# Patient Record
Sex: MALE | Race: WHITE | Employment: UNEMPLOYED | URBAN - METROPOLITAN AREA
[De-identification: names, ages, dates, MRNs, and addresses within clinical notes are randomized per-mention and may not be internally consistent; named-entity substitution may affect disease eponyms.]

---

## 2017-07-25 ENCOUNTER — HOSPITAL ENCOUNTER (OUTPATIENT)
Dept: LAB | Age: 67
Discharge: HOME | End: 2017-07-25

## 2017-07-25 PROCEDURE — 88305 TISSUE EXAM BY PATHOLOGIST: CPT | Performed by: UROLOGY

## 2018-08-23 ENCOUNTER — HOSPITAL ENCOUNTER (OUTPATIENT)
Dept: LAB | Age: 68
Discharge: HOME | End: 2018-08-23

## 2018-08-23 PROCEDURE — 88305 TISSUE EXAM BY PATHOLOGIST: CPT

## 2020-09-02 ENCOUNTER — HOSPITAL ENCOUNTER (OUTPATIENT)
Dept: LAB | Age: 70
Discharge: HOME | End: 2020-09-02

## 2020-09-02 PROCEDURE — 88342 IMHCHEM/IMCYTCHM 1ST ANTB: CPT

## 2020-09-02 PROCEDURE — 88305 TISSUE EXAM BY PATHOLOGIST: CPT

## 2021-05-28 ENCOUNTER — HOSPITAL ENCOUNTER (OUTPATIENT)
Dept: MRI IMAGING | Age: 71
Discharge: HOME | End: 2021-05-28
Attending: UROLOGY
Payer: MEDICARE

## 2021-05-28 DIAGNOSIS — C61 MALIGNANT NEOPLASM OF PROSTATE (HCC): ICD-10-CM

## 2021-05-28 PROCEDURE — 72197 MRI PELVIS W/O & W/DYE: CPT

## 2021-05-28 PROCEDURE — 74011636320 HC RX REV CODE- 636/320: Performed by: UROLOGY

## 2021-05-28 PROCEDURE — A9576 INJ PROHANCE MULTIPACK: HCPCS | Performed by: UROLOGY

## 2021-05-28 PROCEDURE — 74011000258 HC RX REV CODE- 258: Performed by: UROLOGY

## 2021-05-28 RX ORDER — SODIUM CHLORIDE 0.9 % (FLUSH) 0.9 %
10 SYRINGE (ML) INJECTION
Status: COMPLETED | OUTPATIENT
Start: 2021-05-28 | End: 2021-05-28

## 2021-05-28 RX ADMIN — SODIUM CHLORIDE 100 ML: 900 INJECTION, SOLUTION INTRAVENOUS at 14:19

## 2021-05-28 RX ADMIN — GADOTERIDOL 16 ML: 279.3 INJECTION, SOLUTION INTRAVENOUS at 14:19

## 2021-05-28 RX ADMIN — Medication 10 ML: at 14:19

## 2022-06-14 DIAGNOSIS — C80.1 MALIGNANT NEOPLASM (HCC): Primary | ICD-10-CM

## 2022-06-22 ENCOUNTER — OFFICE VISIT (OUTPATIENT)
Dept: UROLOGY | Age: 72
End: 2022-06-22
Payer: COMMERCIAL

## 2022-06-22 DIAGNOSIS — C80.1 MALIGNANT NEOPLASM (HCC): Primary | ICD-10-CM

## 2022-06-22 LAB
BILIRUBIN, URINE, POC: NEGATIVE
BLOOD URINE, POC: NEGATIVE
GLUCOSE URINE, POC: NEGATIVE
KETONES, URINE, POC: NEGATIVE
LEUKOCYTE ESTERASE, URINE, POC: NEGATIVE
NITRITE, URINE, POC: NEGATIVE
PH, URINE, POC: 6.5 (ref 4.6–8)
PROTEIN,URINE, POC: NEGATIVE
SPECIFIC GRAVITY, URINE, POC: 1.02 (ref 1–1.03)
URINALYSIS CLARITY, POC: NORMAL
URINALYSIS COLOR, POC: NORMAL
UROBILINOGEN, POC: NORMAL

## 2022-06-22 PROCEDURE — 81003 URINALYSIS AUTO W/O SCOPE: CPT | Performed by: UROLOGY

## 2022-06-22 PROCEDURE — 99213 OFFICE O/P EST LOW 20 MIN: CPT | Performed by: UROLOGY

## 2022-06-22 PROCEDURE — 1123F ACP DISCUSS/DSCN MKR DOCD: CPT | Performed by: UROLOGY

## 2022-06-22 RX ORDER — AMLODIPINE BESYLATE 2.5 MG/1
TABLET ORAL
COMMUNITY
Start: 2022-06-14

## 2022-06-22 RX ORDER — ROSUVASTATIN CALCIUM 5 MG/1
TABLET, COATED ORAL
COMMUNITY
Start: 2022-06-13

## 2022-06-22 ASSESSMENT — ENCOUNTER SYMPTOMS
EYES NEGATIVE: 1
RESPIRATORY NEGATIVE: 1

## 2022-06-22 NOTE — PROGRESS NOTES
[facility]  [address] [facility] [address], [address] [facility]  [phone]          Vida Palafox  : 1950    Chief Complaint   Patient presents with    Prostate Cancer          HPI     Vida Palafox is a 67 y.o. male    PSA 6.2022  PSA 5.2021   PSA 7.  PSA 5.2020  PSA 4.  PSA 4.4 2019  PSA 4.  PSA 5.5 2018  PSA 4. 2018  PSA 4. 2017     The patient had a biopsy in 2017 that showed Akhil 6 adenocarcinoma in 15% of 1 out of 3 cores from the left mid and less than 5% of 1 out of 3 cores from the right apex.  Repeat biopsy in 2018 showed less than 5% involvement with Akhil 6 adenocarcinoma in 1 out of 3 biopsies from the right apex.  PSAs have been relatively stable.    He does have some lower urinary tract symptoms including hesitancy in the morning and some urgency, particularly when he is traveling in a car. [facility] had been on Uroxatrol but this gave him a significant headache.  At his previous biopsy prostate volume was estimated at 68 cm³.    His most recent biopsy in 2020 showed less that 5% involvement with [place] 6 adenocarcinoma in 1 out of 2 biopsies from the left apex and both of the biopsies from the right apex. The patient recently started using green tea and says that his voiding symptoms have significantly improved. He was intolerant of alpha blockade secondary to headache. Past Medical History:   Diagnosis Date    Erectile dysfunction     Personal history of prostate cancer      No past surgical history on file.   Current Outpatient Medications   Medication Sig Dispense Refill    amLODIPine (NORVASC) 2.5 MG tablet       rosuvastatin (CRESTOR) 5 MG tablet       aspirin 81 MG EC tablet Take by mouth daily      Cholecalciferol 50 MCG (2000) TABS Take by mouth      triamcinolone (ARISTOCORT) 0.5 % cream APPLY 1 APPLICATION TO AFFECTED AREA DAILY       No current facility-administered medications for this visit. Allergies   Allergen Reactions    Statins      Social History     Socioeconomic History    Marital status:      Spouse name: Not on file    Number of children: Not on file    Years of education: Not on file    Highest education level: Not on file   Occupational History    Not on file   Tobacco Use    Smoking status: Former Smoker    Smokeless tobacco: Never Used   Substance and Sexual Activity    Alcohol use: No    Drug use: Never    Sexual activity: Not on file   Other Topics Concern    Not on file   Social History Narrative    Not on file     Social Determinants of Health     Financial Resource Strain:     Difficulty of Paying Living Expenses: Not on file   Food Insecurity:     Worried About [employer] in the Last Year: Not on file    Donis of Food in the Last Year: Not on file   Transportation Needs:     Lack of Transportation (Medical): Not on file    Lack of Transportation (Non-Medical):  Not on file   Physical Activity:     Days of Exercise per Week: Not on file    Minutes of Exercise per Session: Not on file   Stress:     Feeling of Stress : Not on file   Social Connections:     Frequency of Communication with Friends and Family: Not on file    Frequency of Social Gatherings with Friends and Family: Not on file    Attends Yazidi Services: Not on file    Active Member of [address] [place] [address] or Organizations: Not on file    Attends Club or Organization Meetings: Not on file    Marital Status: Not on file   Intimate Partner Violence:     Fear of Current or Ex-Partner: Not on file    Emotionally Abused: Not on file    Physically Abused: Not on file    Sexually Abused: Not on file   Housing Stability:     Unable to Pay for Housing in the Last Year: Not on file    Number of [place] in the Last Year: Not on file    Unstable Housing in the Last Year: Not on file     Family History Problem Relation Age of Onset    Cancer Mother        Review of Systems  Constitutional: Negative  Skin: Negative skin ROS  Eyes: Eyes negative  ENT: HENT negative  Respiratory: Respiratory negative  Cardiovascular: Positive for hypertension. GI: Neg GI ROS  Genitourinary: Genitourinary negative  Musculoskeletal: Musculoskeletal negative  Neurological: Neg neuro ROS  Psychological: Neg psych ROS  Endocrine: Endocrine negative  Hem/Lymphatic: Hematologic/lymphatic negative      Urinalysis  UA - Dipstick  Results for orders placed or performed in visit on 06/22/22   AMB POC URINALYSIS DIP STICK AUTO W/O MICRO   Result Value Ref Range    Color (UA POC)      Clarity (UA POC)      Glucose, Urine, POC Negative Negative    Bilirubin, Urine, POC Negative Negative    KETONES, Urine, POC Negative Negative    Specific Gravity, Urine, POC 1.020 1.001 - 1.035    Blood (UA POC) Negative Negative    pH, Urine, POC 6.5 4.6 - 8.0    Protein, Urine, POC Negative Negative    Urobilinogen, POC Normal     Nitrite, Urine, POC Negative Negative    Leukocyte Esterase, Urine, POC Negative Negative       UA - Micro  WBC - 0  RBC - 0  Bacteria - 0  Epith - 0        Assessment and Plan    ICD-10-CM    1. Malignant neoplasm (HCC)  C80.1 AMB POC URINALYSIS DIP STICK AUTO W/O MICRO     The patient is doing well.   We will have him return for transrectal ultrasound biopsy of the prostate in August.

## 2022-06-23 ENCOUNTER — TELEPHONE (OUTPATIENT)
Dept: UROLOGY | Age: 72
End: 2022-06-23

## 2022-06-23 RX ORDER — CIPROFLOXACIN 500 MG/1
500 TABLET, FILM COATED ORAL 2 TIMES DAILY
Qty: 6 TABLET | Refills: 0 | Status: SHIPPED | OUTPATIENT
Start: 2022-06-23 | End: 2022-06-26

## 2022-08-17 ENCOUNTER — OFFICE VISIT (OUTPATIENT)
Dept: UROLOGY | Age: 72
End: 2022-08-17
Payer: COMMERCIAL

## 2022-08-17 DIAGNOSIS — C61 PROSTATE CANCER (HCC): Primary | ICD-10-CM

## 2022-08-17 PROCEDURE — 81003 URINALYSIS AUTO W/O SCOPE: CPT | Performed by: UROLOGY

## 2022-08-17 PROCEDURE — 55700 BIOPSY OF PROSTATE,NEEDLE/PUNCH: CPT | Performed by: UROLOGY

## 2022-08-17 PROCEDURE — 76942 ECHO GUIDE FOR BIOPSY: CPT | Performed by: UROLOGY

## 2022-08-17 RX ORDER — CEFTRIAXONE 1 G/1
1000 INJECTION, POWDER, FOR SOLUTION INTRAMUSCULAR; INTRAVENOUS EVERY 24 HOURS
Status: SHIPPED | OUTPATIENT
Start: 2022-08-17

## 2022-08-17 NOTE — LETTER
[facility]  [address] [place] [address] Way  [address] [address]  Phone: [phone]  Fax: [phone]    Ignacia Mcgregor MD    August 17, 2022     Hanh De Jesus MD  [address] [facility]    Patient: Uriel Miranda   MR Number: [id number]   YOB: 1950   Date of Visit: 8/17/2022       Dear Hanh De Jesus: Thank you for referring Ada Esposito to me for evaluation/treatment. Below are the relevant portions of my assessment and plan of care. If you have questions, please do not hesitate to call me. I look forward to following Susan Vasile along with you.     Sincerely,      Ignacia Mcgregor MD

## 2022-08-17 NOTE — PROGRESS NOTES
[facility]  [address] [facility]   [address]  [facility], [address] [facility]  [phone]    Madeline Mortimer  : 1950         HPI   67 y.o., male presents for prostate biopsy. Past Medical History:   Diagnosis Date    Erectile dysfunction     Personal history of prostate cancer      No past surgical history on file.   Current Outpatient Medications   Medication Sig Dispense Refill    amLODIPine (NORVASC) 2.5 MG tablet       rosuvastatin (CRESTOR) 5 MG tablet       aspirin 81 MG EC tablet Take by mouth daily      Cholecalciferol 50 MCG (2000) TABS Take by mouth      triamcinolone (ARISTOCORT) 0.5 % cream APPLY 1 APPLICATION TO AFFECTED AREA DAILY       Current Facility-Administered Medications   Medication Dose Route Frequency Provider Last Rate Last Admin    cefTRIAXone (ROCEPHIN) injection 1,000 mg  1,000 mg IntraMUSCular Q24H Marilee Kern MD         Allergies   Allergen Reactions    Statins      Social History     Socioeconomic History    Marital status:      Spouse name: Not on file    Number of children: Not on file    Years of education: Not on file    Highest education level: Not on file   Occupational History    Not on file   Tobacco Use    Smoking status: Former    Smokeless tobacco: Never   Substance and Sexual Activity    Alcohol use: No    Drug use: Never    Sexual activity: Not on file   Other Topics Concern    Not on file   Social History Narrative    Not on file     Social Determinants of Health     Financial Resource Strain: Not on file   Food Insecurity: Not on file   Transportation Needs: Not on file   Physical Activity: Not on file   Stress: Not on file   Social Connections: Not on file   Intimate Partner Violence: Not on file   Housing Stability: Not on file     Family History   Problem Relation Age of Onset    Cancer Mother        UA - Dipstick  Results for orders placed or performed in visit on 22   AMB POC URINALYSIS DIP STICK AUTO W/O MICRO   Result Value Ref Range    Color (UA POC)      Clarity (UA POC)      Glucose, Urine, POC Negative Negative    Bilirubin, Urine, POC Negative Negative    KETONES, Urine, POC Negative Negative    Specific Gravity, Urine, POC 1.020 1.001 - 1.035    Blood (UA POC) Negative Negative    pH, Urine, POC 6.5 4.6 - 8.0    Protein, Urine, POC Negative Negative    Urobilinogen, POC 0.2 mg/dL     Nitrite, Urine, POC Negative Negative    Leukocyte Esterase, Urine, POC Negative Negative       UA - Micro  WBC - 0  RBC - 0  Bacteria - 0  Epith - 0            TRANSRECTAL ULTRASOUND GUIDED BIOPSY OF THE PROSTATE    All risks, benefits and alternatives were again reviewed and he is willing to proceed at this time. The patient was placed in the left lateral decubitus position and the transrectal ultrasound probe was inserted into the rectum. The prostate was visualized and measured. The prostate appeared homogenous in appearance. Measured prostate volume is listed below. 10 cc of 1% lidocaine (plain) was infiltrated in the neurovascular bundles bilaterally. 12 needle core biopsies were obtained using a standard sextant biopsy format. Three far lateral biopsies were also obtained bilaterally. The ultrasound probe was removed. The patient tolerated the procedure well. Discharge instructions were again reviewed with the patient in detail. He was instructed to resume his antibiotics as previously ordered and was instructed to stay off all anticoagulation for 72 hours or until no further bleeding (hematuria or hematochezia) is noted. He was instructed to call the office immediately for any fevers or significant bleeding. PROSTATE VOLUME:  64 cm3    Assessment and Plan    ICD-10-CM    1.  Prostate cancer ([facility] [address]  C61 AMB POC US, TRANSRECTAL     [facility]     [facility] SURGICAL TRAYS     BIOPSY OF PROSTATE,NEEDLE/PUNCH     cefTRIAXone (ROCEPHIN) injection 1,000 mg     AMB POC URINALYSIS DIP STICK AUTO W/O MICRO     [facility]

## 2022-09-13 DIAGNOSIS — C61 PROSTATE CA (HCC): Primary | ICD-10-CM

## 2023-03-30 ENCOUNTER — OFFICE VISIT (OUTPATIENT)
Dept: UROLOGY | Age: 73
End: 2023-03-30

## 2023-03-30 DIAGNOSIS — N13.8 BPH WITH OBSTRUCTION/LOWER URINARY TRACT SYMPTOMS: ICD-10-CM

## 2023-03-30 DIAGNOSIS — N40.1 BPH WITH OBSTRUCTION/LOWER URINARY TRACT SYMPTOMS: ICD-10-CM

## 2023-03-30 DIAGNOSIS — C61 PROSTATE CA (HCC): Primary | ICD-10-CM

## 2023-03-30 LAB
BILIRUBIN, URINE, POC: NEGATIVE
BLOOD URINE, POC: NEGATIVE
GLUCOSE URINE, POC: 100
KETONES, URINE, POC: NEGATIVE
LEUKOCYTE ESTERASE, URINE, POC: NEGATIVE
NITRITE, URINE, POC: NEGATIVE
PH, URINE, POC: 6 (ref 4.6–8)
PROTEIN,URINE, POC: NEGATIVE
SPECIFIC GRAVITY, URINE, POC: 1.02 (ref 1–1.03)
URINALYSIS CLARITY, POC: NORMAL
URINALYSIS COLOR, POC: NORMAL
UROBILINOGEN, POC: NORMAL

## 2023-03-30 RX ORDER — LOSARTAN POTASSIUM AND HYDROCHLOROTHIAZIDE 12.5; 1 MG/1; MG/1
1 TABLET ORAL DAILY
COMMUNITY
Start: 2022-12-31

## 2023-03-30 ASSESSMENT — ENCOUNTER SYMPTOMS
BACK PAIN: 0
NAUSEA: 0

## 2023-03-30 NOTE — PROGRESS NOTES
Reported on 3/30/2023)      rosuvastatin (CRESTOR) 5 MG tablet  (Patient not taking: Reported on 3/30/2023)       Current Facility-Administered Medications   Medication Dose Route Frequency Provider Last Rate Last Admin    cefTRIAXone (ROCEPHIN) injection 1,000 mg  1,000 mg IntraMUSCular Q24H Judge Genet MD         Allergies   Allergen Reactions    Statins      Social History     Socioeconomic History    Marital status:      Spouse name: Not on file    Number of children: Not on file    Years of education: Not on file    Highest education level: Not on file   Occupational History    Not on file   Tobacco Use    Smoking status: Former    Smokeless tobacco: Never   Substance and Sexual Activity    Alcohol use: No    Drug use: Never    Sexual activity: Not on file   Other Topics Concern    Not on file   Social History Narrative    Not on file     Social Determinants of Health     Financial Resource Strain: Not on file   Food Insecurity: Not on file   Transportation Needs: Not on file   Physical Activity: Not on file   Stress: Not on file   Social Connections: Not on file   Intimate Partner Violence: Not on file   Housing Stability: Not on file     Family History   Problem Relation Age of Onset    Cancer Mother        Review of Systems  Constitutional:   Negative for fever. GI:  Negative for nausea. Musculoskeletal:  Negative for back pain.     Urinalysis  UA - Dipstick  Results for orders placed or performed in visit on 03/30/23   AMB POC URINALYSIS DIP STICK AUTO W/O MICRO   Result Value Ref Range    Color (UA POC)      Clarity (UA POC)      Glucose, Urine,  Negative    Bilirubin, Urine, POC Negative Negative    KETONES, Urine, POC Negative Negative    Specific Gravity, Urine, POC 1.020 1.001 - 1.035    Blood (UA POC) Negative Negative    pH, Urine, POC 6.0 4.6 - 8.0    Protein, Urine, POC Negative Negative    Urobilinogen, POC 0.2 mg/dL     Nitrite, Urine, POC Negative Negative    Leukocyte

## 2023-11-24 ENCOUNTER — NURSE ONLY (OUTPATIENT)
Dept: UROLOGY | Age: 73
End: 2023-11-24

## 2023-11-24 DIAGNOSIS — C61 PROSTATE CA (HCC): Primary | ICD-10-CM

## 2023-11-25 LAB — PSA SERPL DL<=0.01 NG/ML-MCNC: 6.03 NG/ML (ref 0–4)

## 2023-11-30 ENCOUNTER — OFFICE VISIT (OUTPATIENT)
Dept: UROLOGY | Age: 73
End: 2023-11-30
Payer: COMMERCIAL

## 2023-11-30 ENCOUNTER — TELEPHONE (OUTPATIENT)
Dept: UROLOGY | Age: 73
End: 2023-11-30

## 2023-11-30 DIAGNOSIS — N13.8 BPH WITH OBSTRUCTION/LOWER URINARY TRACT SYMPTOMS: ICD-10-CM

## 2023-11-30 DIAGNOSIS — N40.1 BPH WITH OBSTRUCTION/LOWER URINARY TRACT SYMPTOMS: ICD-10-CM

## 2023-11-30 DIAGNOSIS — C80.1 MALIGNANT NEOPLASM (HCC): ICD-10-CM

## 2023-11-30 DIAGNOSIS — R97.20 ELEVATED PROSTATE SPECIFIC ANTIGEN (PSA): ICD-10-CM

## 2023-11-30 DIAGNOSIS — C61 PROSTATE CA (HCC): Primary | ICD-10-CM

## 2023-11-30 LAB
BILIRUBIN, URINE, POC: NEGATIVE
BLOOD URINE, POC: NEGATIVE
GLUCOSE URINE, POC: NEGATIVE
KETONES, URINE, POC: NEGATIVE
LEUKOCYTE ESTERASE, URINE, POC: NEGATIVE
NITRITE, URINE, POC: NEGATIVE
PH, URINE, POC: 7 (ref 4.6–8)
PROTEIN,URINE, POC: NEGATIVE
SPECIFIC GRAVITY, URINE, POC: 1.02 (ref 1–1.03)
URINALYSIS CLARITY, POC: NORMAL
URINALYSIS COLOR, POC: NORMAL
UROBILINOGEN, POC: NORMAL

## 2023-11-30 PROCEDURE — 99213 OFFICE O/P EST LOW 20 MIN: CPT | Performed by: UROLOGY

## 2023-11-30 PROCEDURE — 81003 URINALYSIS AUTO W/O SCOPE: CPT | Performed by: UROLOGY

## 2023-11-30 PROCEDURE — 1123F ACP DISCUSS/DSCN MKR DOCD: CPT | Performed by: UROLOGY

## 2023-11-30 ASSESSMENT — ENCOUNTER SYMPTOMS
NAUSEA: 0
BACK PAIN: 0

## 2023-11-30 NOTE — TELEPHONE ENCOUNTER
----- Message from Alberto Michaud MD sent at 11/30/2023 12:40 PM EST -----  Please schedule saturation biopsies of the prostate sometime in March.  We do not need the MRI

## 2023-11-30 NOTE — PROGRESS NOTES
Activity    Alcohol use: No    Drug use: Never    Sexual activity: Not on file   Other Topics Concern    Not on file   Social History Narrative    Not on file     Social Determinants of Health     Financial Resource Strain: Not on file   Food Insecurity: Not on file   Transportation Needs: Not on file   Physical Activity: Not on file   Stress: Not on file   Social Connections: Not on file   Intimate Partner Violence: Not on file   Housing Stability: Not on file     Family History   Problem Relation Age of Onset    Cancer Mother        Review of Systems  Constitutional:   Negative for fever. GI:  Negative for nausea. Genitourinary: Positive for nocturia. Musculoskeletal:  Negative for back pain. Urinalysis  UA - Dipstick  Results for orders placed or performed in visit on 11/30/23   AMB POC URINALYSIS DIP STICK AUTO W/O MICRO   Result Value Ref Range    Color (UA POC)      Clarity (UA POC)      Glucose, Urine, POC Negative Negative    Bilirubin, Urine, POC Negative Negative    KETONES, Urine, POC Negative Negative    Specific Gravity, Urine, POC 1.020 1.001 - 1.035    Blood (UA POC) Negative Negative    pH, Urine, POC 7.0 4.6 - 8.0    Protein, Urine, POC Negative Negative    Urobilinogen, POC Normal     Nitrite, Urine, POC Negative Negative    Leukocyte Esterase, Urine, POC Negative Negative       UA - Micro  WBC - 0  RBC - 0  Bacteria - 0  Epith - 0        Assessment and Plan    ICD-10-CM    1. Prostate CA (HCC)  C61 AMB POC URINALYSIS DIP STICK AUTO W/O MICRO      2. BPH with obstruction/lower urinary tract symptoms  N40.1 AMB POC URINALYSIS DIP STICK AUTO W/O MICRO    N13.8       3. Malignant neoplasm (HCC)  C80.1 AMB POC URINALYSIS DIP STICK AUTO W/O MICRO        Currently were pursuing a course of active surveillance. Will schedule his next biopsy under anesthesia in March 2024.

## 2024-01-05 ENCOUNTER — TELEPHONE (OUTPATIENT)
Dept: UROLOGY | Age: 74
End: 2024-01-05

## 2024-01-05 PROBLEM — R97.20 ELEVATED PROSTATE SPECIFIC ANTIGEN (PSA): Status: ACTIVE | Noted: 2023-11-30

## 2024-01-05 NOTE — TELEPHONE ENCOUNTER
Pt calling stating that he received a call from Samia about a Biopsy for March 8th under general anaesthesia and he is concerned why it is this way instead of in the office because he stated that he has issues with being put to sleep. Let him know as much info I could about the procedure and why it would be under general but the Pt still wanted me to send Dr Michaud a message for his input. Let Pt know it has been sent and then also let him know that Jaswant will be out of the office until Jan 22nd so bare with us about the response time. Pt thanked me for letting him know and sending the message to Dr Michaud.

## 2024-01-05 NOTE — TELEPHONE ENCOUNTER
Procedures: Procedure(s):   PROSTATE SATURATION BIOPSY   Date: 3/8/2024   Time: 0800   Location: [facility] MAIN OR  [place]

## 2024-01-09 ENCOUNTER — PREP FOR PROCEDURE (OUTPATIENT)
Dept: UROLOGY | Age: 74
End: 2024-01-09

## 2024-01-09 RX ORDER — SODIUM CHLORIDE 9 MG/ML
INJECTION, SOLUTION INTRAVENOUS PRN
OUTPATIENT
Start: 2024-01-09

## 2024-01-09 RX ORDER — SODIUM CHLORIDE 0.9 % (FLUSH) 0.9 %
5-40 SYRINGE (ML) INJECTION EVERY 12 HOURS SCHEDULED
OUTPATIENT
Start: 2024-01-09

## 2024-01-09 RX ORDER — SODIUM CHLORIDE 0.9 % (FLUSH) 0.9 %
5-40 SYRINGE (ML) INJECTION PRN
OUTPATIENT
Start: 2024-01-09

## 2024-01-10 ENCOUNTER — TELEPHONE (OUTPATIENT)
Dept: UROLOGY | Age: 74
End: 2024-01-10

## 2024-01-10 NOTE — TELEPHONE ENCOUNTER
Pt called on 1/5/24 stating that he received a call from Samia about a Biopsy for March 8th under general anaesthesia and he is concerned why it is this way instead of in the office because he stated that he has issues with being put to sleep. Let him know as much info I could about the procedure and why it would be under general but the Pt still wanted me to send Dr Michaud a message for his input. Dr Michaud advised that we could do this in office. Called and left  for Pt today that we have scheduled him for a TRUS Biopsy at 10:45 AM on Wednesday February 21, 2024 at our [facility] office. Left PGU number on  for Pt to call back if this appt does not work for him.

## 2024-02-15 ENCOUNTER — TELEPHONE (OUTPATIENT)
Dept: UROLOGY | Age: 74
End: 2024-02-15

## 2024-02-15 NOTE — TELEPHONE ENCOUNTER
Called Pt's wife to f/u to make sure it was the Surgery for the Prostate Saturation Biopsy that they were wanting to cancel due to Pt not able to be under general. PT's wife confirmed that was correct. Informed Samia to cancel surgery and informed Pt's wife that I have informed Samia. Pt's wife confirmed TRUS Biopsy appt 2/21/24 at 10:45.

## 2024-02-15 NOTE — TELEPHONE ENCOUNTER
Pt's wife called and left VM stating that there is some confusion about the upcoming appointment. Called Pt's wife back with no answer, left VM about f/u appt date and time and left PGU number on VM for any questions.

## 2024-02-15 NOTE — TELEPHONE ENCOUNTER
----- Message from Martha Lee MA sent at 2/15/2024  2:03 PM EST -----  Regarding: Surgery 3/8/24  Pt is having TRUS Biopsy done in the office due to not being able to be under general and was given the okay by Dr Michaud to do this in the office, so we need to cancel this surgery. Pt wife called this afternoon to make sure that we have canceled this procedure.

## 2024-02-21 ENCOUNTER — OFFICE VISIT (OUTPATIENT)
Dept: UROLOGY | Age: 74
End: 2024-02-21
Payer: COMMERCIAL

## 2024-02-21 DIAGNOSIS — N40.1 BENIGN PROSTATIC HYPERPLASIA WITH LOWER URINARY TRACT SYMPTOMS, SYMPTOM DETAILS UNSPECIFIED: ICD-10-CM

## 2024-02-21 DIAGNOSIS — R97.20 ELEVATED PROSTATE SPECIFIC ANTIGEN (PSA): Primary | ICD-10-CM

## 2024-02-21 PROCEDURE — 76872 US TRANSRECTAL: CPT | Performed by: UROLOGY

## 2024-02-21 PROCEDURE — 81003 URINALYSIS AUTO W/O SCOPE: CPT | Performed by: UROLOGY

## 2024-02-21 PROCEDURE — 55700 BIOPSY OF PROSTATE,NEEDLE/PUNCH: CPT | Performed by: UROLOGY

## 2024-02-21 PROCEDURE — 96372 THER/PROPH/DIAG INJ SC/IM: CPT | Performed by: UROLOGY

## 2024-02-21 PROCEDURE — A4550 SURGICAL TRAYS: HCPCS | Performed by: UROLOGY

## 2024-02-21 RX ORDER — CEFTRIAXONE 1 G/1
1000 INJECTION, POWDER, FOR SOLUTION INTRAMUSCULAR; INTRAVENOUS EVERY 24 HOURS
Status: SHIPPED | OUTPATIENT
Start: 2024-02-21

## 2024-02-21 RX ORDER — SULFAMETHOXAZOLE AND TRIMETHOPRIM 800; 160 MG/1; MG/1
1 TABLET ORAL 2 TIMES DAILY
Qty: 6 TABLET | Refills: 0 | Status: SHIPPED | OUTPATIENT
Start: 2024-02-21 | End: 2024-02-24

## 2024-02-21 RX ADMIN — CEFTRIAXONE 1000 MG: 1 INJECTION, POWDER, FOR SOLUTION INTRAMUSCULAR; INTRAVENOUS at 10:52

## 2024-02-21 NOTE — PROGRESS NOTES
[facility]  [address] [facility]   [address]  [place] [address]  [phone]    Sidney Hill  : 1950         HPI   73 y.o., male presents for prostate biopsy.    The patient received 1 g of Rocephin prior to the procedure given IM.    Past Medical History:   Diagnosis Date    Erectile dysfunction     Personal history of prostate cancer      No past surgical history on file.  Current Outpatient Medications   Medication Sig Dispense Refill    losartan-hydroCHLOROthiazide (HYZAAR) 100-12.5 MG per tablet Take 1 tablet by mouth daily      amLODIPine (NORVASC) 2.5 MG tablet       rosuvastatin (CRESTOR) 5 MG tablet       aspirin 81 MG EC tablet Take by mouth daily      Cholecalciferol 50 MCG (2000) TABS Take by mouth      triamcinolone (ARISTOCORT) 0.5 % cream APPLY 1 APPLICATION TO AFFECTED AREA DAILY       Current Facility-Administered Medications   Medication Dose Route Frequency Provider Last Rate Last Admin    cefTRIAXone (ROCEPHIN) injection 1,000 mg  1,000 mg IntraMUSCular Q24H Alberto Mihcaud MD   1,000 mg at 24 1052    cefTRIAXone (ROCEPHIN) injection 1,000 mg  1,000 mg IntraMUSCular Q24H Alberto Michaud MD         Allergies   Allergen Reactions    Statins      Social History     Socioeconomic History    Marital status:      Spouse name: Not on file    Number of children: Not on file    Years of education: Not on file    Highest education level: Not on file   Occupational History    Not on file   Tobacco Use    Smoking status: Former    Smokeless tobacco: Never   Substance and Sexual Activity    Alcohol use: No    Drug use: Never    Sexual activity: Not on file   Other Topics Concern    Not on file   Social History Narrative    Not on file     Social Determinants of Health     Financial Resource Strain: Not on file   Food Insecurity: Not on file   Transportation Needs: Not on file   Physical Activity: Not on file   Stress: Not on file   Social Connections:

## 2024-04-02 ENCOUNTER — TELEPHONE (OUTPATIENT)
Dept: UROLOGY | Age: 74
End: 2024-04-02

## 2024-04-02 NOTE — TELEPHONE ENCOUNTER
Pt's wife calling wanting Path report results for Pt. Informed Pt's wife that I will inform Dr Michaud and he will be in touch. Wife frustrated, but understanding.

## 2024-04-03 NOTE — PROGRESS NOTES
Patient's biopsies and back showing 3 areas with less than 5% involvement with [place] 6 adenocarcinoma.  Back in July 2017 he had 1 core with 15% involvement.  I discussed these results by phone today.  He informed me that he recently had a PSA of 8.5 at his primary care physician's office.  I reminded him that after biopsy your PSA could be elevated for up to 6 to 8 weeks and that this most recent PSA cannot be interpreted in any reasonable fashion.  I will see him back in 6 months.

## 2024-09-03 ENCOUNTER — TELEPHONE (OUTPATIENT)
Dept: UROLOGY | Age: 74
End: 2024-09-03

## 2024-12-05 ENCOUNTER — OFFICE VISIT (OUTPATIENT)
Dept: UROLOGY | Age: 74
End: 2024-12-05
Payer: MEDICARE

## 2024-12-05 DIAGNOSIS — C61 PROSTATE CA (HCC): ICD-10-CM

## 2024-12-05 DIAGNOSIS — C61 PROSTATE CA (HCC): Primary | ICD-10-CM

## 2024-12-05 DIAGNOSIS — N40.1 BENIGN PROSTATIC HYPERPLASIA WITH LOWER URINARY TRACT SYMPTOMS, SYMPTOM DETAILS UNSPECIFIED: ICD-10-CM

## 2024-12-05 LAB
BILIRUBIN, URINE, POC: NEGATIVE
BLOOD URINE, POC: NEGATIVE
GLUCOSE URINE, POC: NEGATIVE MG/DL
KETONES, URINE, POC: NEGATIVE MG/DL
LEUKOCYTE ESTERASE, URINE, POC: NEGATIVE
NITRITE, URINE, POC: NEGATIVE
PH, URINE, POC: 7 (ref 4.6–8)
PROTEIN,URINE, POC: NEGATIVE MG/DL
SPECIFIC GRAVITY, URINE, POC: 1.02 (ref 1–1.03)
URINALYSIS CLARITY, POC: NORMAL
URINALYSIS COLOR, POC: NORMAL
UROBILINOGEN, POC: NORMAL MG/DL

## 2024-12-05 PROCEDURE — 1159F MED LIST DOCD IN RCRD: CPT | Performed by: UROLOGY

## 2024-12-05 PROCEDURE — 1123F ACP DISCUSS/DSCN MKR DOCD: CPT | Performed by: UROLOGY

## 2024-12-05 PROCEDURE — 81003 URINALYSIS AUTO W/O SCOPE: CPT | Performed by: UROLOGY

## 2024-12-05 PROCEDURE — 99213 OFFICE O/P EST LOW 20 MIN: CPT | Performed by: UROLOGY

## 2024-12-05 RX ORDER — TRAMADOL HYDROCHLORIDE 50 MG/1
TABLET ORAL
COMMUNITY
Start: 2024-03-28

## 2024-12-05 ASSESSMENT — ENCOUNTER SYMPTOMS
BACK PAIN: 0
NAUSEA: 0

## 2024-12-05 NOTE — PROGRESS NOTES
[facility]  [address] [place] [address]  [phone]          Sidney Hill  : 1950    Chief Complaint   Patient presents with    9 Month Follow Up    Elevated PSA          HPI     Sidney Hill is a 74 y.o. male    PSA 8.2024  PSA 6.0 3 2023  PSA 7.1 3 2023  PSA 6.2022  PSA 5.2021   PSA 7.  PSA 5.2020  PSA 4.  PSA 4.4 2019  PSA 4.  PSA 5.5 2018  PSA 4. 2018  PSA 4. 2017     Biopsy was performed in 2022 and revealed [place] 6 adenocarcinoma in less than 5% of the biopsy material from the right apex.  The other 5 sextants were completely benign except for small focus of atypical glands in the left mid.  The patient had a biopsy in 2017 that showed [place] 6 adenocarcinoma in 15% of 1 out of 3 cores from the left mid and less than 5% of 1 out of 3 cores from the right apex.  Repeat biopsy in 2018 showed less than 5% involvement with Akhil 6 adenocarcinoma in 1 out of 3 biopsies from the right apex.  PSAs have been relatively stable.    He does have some lower urinary tract symptoms including hesitancy in the morning and some urgency, particularly when he is traveling in a car.  He had been on Uroxatrol but this gave him a significant headache.  At his previous biopsy prostate volume was estimated at 68 cm³.      Past Medical History:   Diagnosis Date    Erectile dysfunction     Personal history of prostate cancer      No past surgical history on file.  Current Outpatient Medications   Medication Sig Dispense Refill    traMADol (ULTRAM) 50 MG tablet 1 tablet Orally up to once daily for pain for 90 days      diclofenac sodium (VOLTAREN) 1 % GEL if needed for pain Externally Four times a day for 90 days      losartan-hydroCHLOROthiazide (HYZAAR) 100-12.5 MG per tablet Take 1 tablet by mouth daily

## 2024-12-07 LAB — PSA SERPL DL<=0.01 NG/ML-MCNC: 9.79 NG/ML (ref 0–4)

## 2025-01-03 ENCOUNTER — TELEPHONE (OUTPATIENT)
Dept: UROLOGY | Age: 75
End: 2025-01-03

## 2025-01-03 NOTE — TELEPHONE ENCOUNTER
Pt's wife calling to discus results of PSA level with Dr Michaud. Informed her that Dr Michaud is OOO for 2 weeks, but I will send him a message to contact her as soon as he is available.

## 2025-01-24 DIAGNOSIS — C61 PROSTATE CA (HCC): Primary | ICD-10-CM

## 2025-05-15 ENCOUNTER — LAB (OUTPATIENT)
Dept: UROLOGY | Age: 75
End: 2025-05-15

## 2025-05-15 ENCOUNTER — TELEPHONE (OUTPATIENT)
Dept: UROLOGY | Age: 75
End: 2025-05-15

## 2025-05-15 DIAGNOSIS — C61 PROSTATE CA (HCC): ICD-10-CM

## 2025-05-15 NOTE — TELEPHONE ENCOUNTER
Patient was put on the schedule for labs today. Patient and spouse was informed that his office visit, isn't until 06/19/25, and labs are done a week before the appointment. Patient was adamant about getting labs today, due to him driving a distance to get here.

## 2025-05-17 LAB — PSA SERPL DL<=0.01 NG/ML-MCNC: 10 NG/ML (ref 0–4)

## 2025-05-19 DIAGNOSIS — R97.20 ELEVATED PROSTATE SPECIFIC ANTIGEN (PSA): Primary | ICD-10-CM

## 2025-06-10 ENCOUNTER — HOSPITAL ENCOUNTER (OUTPATIENT)
Dept: MRI IMAGING | Age: 75
Discharge: HOME | End: 2025-06-12
Attending: UROLOGY
Payer: MEDICARE

## 2025-06-10 DIAGNOSIS — R97.20 ELEVATED PROSTATE SPECIFIC ANTIGEN (PSA): ICD-10-CM

## 2025-06-10 PROCEDURE — 6360000004 HC RX CONTRAST MEDICATION: Performed by: UROLOGY

## 2025-06-10 PROCEDURE — A9579 GAD-BASE MR CONTRAST NOS,1ML: HCPCS | Performed by: UROLOGY

## 2025-06-10 PROCEDURE — 72197 MRI PELVIS W/O & W/DYE: CPT

## 2025-06-10 RX ORDER — GADOTERIDOL 279.3 MG/ML
17 INJECTION INTRAVENOUS
Status: COMPLETED | OUTPATIENT
Start: 2025-06-10 | End: 2025-06-10

## 2025-06-10 RX ADMIN — GADOTERIDOL 17 ML: 279.3 INJECTION, SOLUTION INTRAVENOUS at 11:28

## 2025-06-19 ENCOUNTER — OFFICE VISIT (OUTPATIENT)
Dept: UROLOGY | Age: 75
End: 2025-06-19
Payer: MEDICARE

## 2025-06-19 DIAGNOSIS — C61 PROSTATE CA (HCC): Primary | ICD-10-CM

## 2025-06-19 DIAGNOSIS — N52.9 ERECTILE DYSFUNCTION, UNSPECIFIED ERECTILE DYSFUNCTION TYPE: ICD-10-CM

## 2025-06-19 DIAGNOSIS — N40.1 BENIGN PROSTATIC HYPERPLASIA WITH LOWER URINARY TRACT SYMPTOMS, SYMPTOM DETAILS UNSPECIFIED: ICD-10-CM

## 2025-06-19 LAB
BILIRUBIN, URINE, POC: NEGATIVE
BLOOD URINE, POC: NEGATIVE
GLUCOSE URINE, POC: NEGATIVE MG/DL
KETONES, URINE, POC: NEGATIVE MG/DL
LEUKOCYTE ESTERASE, URINE, POC: NEGATIVE
NITRITE, URINE, POC: NEGATIVE
PH, URINE, POC: 5.5 (ref 4.6–8)
PROTEIN,URINE, POC: NEGATIVE MG/DL
SPECIFIC GRAVITY, URINE, POC: 1.02 (ref 1–1.03)
URINALYSIS CLARITY, POC: NORMAL
URINALYSIS COLOR, POC: NORMAL
UROBILINOGEN, POC: NORMAL MG/DL

## 2025-06-19 PROCEDURE — 1123F ACP DISCUSS/DSCN MKR DOCD: CPT | Performed by: UROLOGY

## 2025-06-19 PROCEDURE — 1159F MED LIST DOCD IN RCRD: CPT | Performed by: UROLOGY

## 2025-06-19 PROCEDURE — 81003 URINALYSIS AUTO W/O SCOPE: CPT | Performed by: UROLOGY

## 2025-06-19 PROCEDURE — 99214 OFFICE O/P EST MOD 30 MIN: CPT | Performed by: UROLOGY

## 2025-06-19 RX ORDER — FINASTERIDE 5 MG/1
5 TABLET, FILM COATED ORAL DAILY
Qty: 90 TABLET | Refills: 3 | Status: SHIPPED | OUTPATIENT
Start: 2025-06-19

## 2025-06-19 RX ORDER — TADALAFIL 5 MG/1
5 TABLET ORAL DAILY
Qty: 90 TABLET | Refills: 1 | Status: SHIPPED | OUTPATIENT
Start: 2025-06-19

## 2025-06-19 RX ORDER — TRAZODONE HYDROCHLORIDE 50 MG/1
TABLET ORAL
COMMUNITY
Start: 2025-04-09

## 2025-06-19 ASSESSMENT — ENCOUNTER SYMPTOMS
BACK PAIN: 0
NAUSEA: 0

## 2025-06-19 NOTE — PROGRESS NOTES
[facility]  [address] [place] [address]  [phone]          Sidney Hill  : 1950    Chief Complaint   Patient presents with    6 Month Follow-Up          HPI     Sidney Hill is a 75 y.o. male    PSA 8.2024  PSA 6.0 3 2023  PSA 7.1 3 2023  PSA 6.2022  PSA 5.2021   PSA 7.  PSA 5.2020  PSA 4.  PSA 4.4 2019  PSA 4.  PSA 5.5 2018  PSA 4. 2018  PSA 4. 2017     Biopsy was performed in 2022 and revealed [place] 6 adenocarcinoma in less than 5% of the biopsy material from the right apex.  The other 5 sextants were completely benign except for small focus of atypical glands in the left mid.  The patient had a biopsy in 2017 that showed [place] 6 adenocarcinoma in 15% of 1 out of 3 cores from the left mid and less than 5% of 1 out of 3 cores from the right apex.  Repeat biopsy in 2018 showed less than 5% involvement with Akhil 6 adenocarcinoma in 1 out of 3 biopsies from the right apex.  PSAs have been relatively stable.    He does have some lower urinary tract symptoms including hesitancy in the morning and some urgency, particularly when he is traveling in a car.  He had been on Uroxatrol but this gave him a significant headache.  At his previous biopsy prostate volume was estimated at 68 cm³.  The the patient had another MRI earlier this month that estimated prostate volume at 134 cm³.  This would give him a PSA density of 0.074.  There was a PI-RADS 4 lesion in the anterior left transitional zone but this has been biopsied before with negative results.  There is marked changes of BPH with an intact capsule and no evidence of metastatic disease.  He is having some moderately bothersome voiding symptoms.  These include slow stream, particularly in the morning, and nocturia 0-2.      Past Medical History:  EXAM: 3 views of the left foot



HISTORY: Foot pain after being attacked by a cat



COMPARISON: 8/10/2020



FINDINGS: 3 views of the left foot shows no evidence of acute fracture or dislocation. Mild soft tiss
ue swelling is seen. No osseous erosions are seen. Vascular calcifications are present. No

degenerative changes are present.



IMPRESSION: No evidence of acute osseous abnormality. 



Reported By: Mik Benavidez 

Electronically Signed:  8/26/2020 8:27 PM